# Patient Record
Sex: MALE | Race: WHITE | Employment: OTHER | URBAN - METROPOLITAN AREA
[De-identification: names, ages, dates, MRNs, and addresses within clinical notes are randomized per-mention and may not be internally consistent; named-entity substitution may affect disease eponyms.]

---

## 2024-02-26 ENCOUNTER — HOSPITAL ENCOUNTER (OUTPATIENT)
Age: 64
Discharge: HOME | End: 2024-02-26
Payer: MEDICARE

## 2024-02-26 VITALS
DIASTOLIC BLOOD PRESSURE: 71 MMHG | TEMPERATURE: 98 F | OXYGEN SATURATION: 100 % | SYSTOLIC BLOOD PRESSURE: 133 MMHG | HEART RATE: 68 BPM | RESPIRATION RATE: 18 BRPM

## 2024-02-26 DIAGNOSIS — J06.9 UPPER RESPIRATORY VIRUS: Primary | ICD-10-CM

## 2024-02-26 LAB
S PYO AG THROAT QL: NEGATIVE
SARS-COV-2 RNA RESP QL NAA+PROBE: NOT DETECTED

## 2024-02-26 PROCEDURE — 87880 STREP A ASSAY W/OPTIC: CPT | Performed by: NURSE PRACTITIONER

## 2024-02-26 PROCEDURE — U0002 COVID-19 LAB TEST NON-CDC: HCPCS | Performed by: NURSE PRACTITIONER

## 2024-02-26 PROCEDURE — 99203 OFFICE O/P NEW LOW 30 MIN: CPT | Performed by: NURSE PRACTITIONER

## 2024-02-26 NOTE — ED INITIAL ASSESSMENT (HPI)
Pt c/o sore throat x5 days, cough, head pressure and nasal congestion x4 days, L ear pain, echo noise and feeling clogged today.  No fever.

## 2024-02-26 NOTE — DISCHARGE INSTRUCTIONS
Push fluids.  Try decongestant like Sudafed during the day and Benadryl or NyQuil at night.  You can also try Flonase.  Tylenol or ibuprofen as needed for pain or fever.  Follow-up with your doctor if your symptoms persist.  Return for any concerns.

## 2024-02-26 NOTE — ED PROVIDER NOTES
Patient Seen in: [facility]      History     Chief Complaint   Patient presents with    Sore Throat     Stated Complaint: sore throat  Subjective:   63-year-old male presents for sinus and nasal congestion, generalized headache, postnasal drip, and a mild dry cough that started about 5 days ago.  No sick contacts at home.  No fevers.  He does feel fullness in his ears.  He does have a sore throat.  No difficulty swallowing.  Speech is clear.  No chest pain or difficulty breathing.  He is eating and drinking without vomiting or diarrhea, but has a decreased appetite.  No neck stiffness.  No rashes.  No dizziness.  He appears nontoxic.      Objective:   History reviewed. No pertinent past medical history.         History reviewed. No pertinent surgical history.           Social History     Socioeconomic History    Marital status: Single   Tobacco Use    Smoking status: Never    Smokeless tobacco: Never   Substance and Sexual Activity    Alcohol use: Yes     Comment: occ    Drug use: Never            Review of Systems   HENT:  Positive for congestion, postnasal drip, rhinorrhea, sinus pressure and sore throat.    Respiratory:  Positive for cough.    All other systems reviewed and are negative.      Positive for stated complaint: sore throat  Other systems are as noted in HPI.  Constitutional and vital signs reviewed.      All other systems reviewed and negative except as noted above.    Physical Exam     ED Triage Vitals   BP 02/26/24 0932 133/71   Pulse 02/26/24 0930 68   Resp 02/26/24 0930 18   Temp 02/26/24 0930 98.3 °F (36.8 °C)   Temp src 02/26/24 0930 Temporal   SpO2 02/26/24 0930 100 %   O2 Device 02/26/24 0930 None (Room air)     Current:/71   Pulse 68   Temp 98.3 °F (36.8 °C) (Temporal)   Resp 18   SpO2 100%     Physical Exam  Vitals and nursing note reviewed.   Constitutional:       General: He is not in acute distress.     Appearance: He is well-developed. He is not  toxic-appearing.   HENT:      Head: Normocephalic.      Right Ear: Tympanic membrane normal.      Left Ear: A middle ear effusion is present.      Nose: Rhinorrhea present. No congestion.      Mouth/Throat:      Mouth: Mucous membranes are moist. No oral lesions.      Pharynx: Oropharynx is clear. Posterior oropharyngeal erythema present. No pharyngeal swelling, oropharyngeal exudate or uvula swelling.      Tonsils: No tonsillar exudate or tonsillar abscesses.   Eyes:      Conjunctiva/sclera: Conjunctivae normal.   Cardiovascular:      Rate and Rhythm: Normal rate and regular rhythm.   Pulmonary:      Effort: Pulmonary effort is normal.      Breath sounds: Normal breath sounds.   Musculoskeletal:      Cervical back: Normal range of motion and neck supple.   Lymphadenopathy:      Cervical: No cervical adenopathy.   Skin:     General: Skin is warm and dry.      Capillary Refill: Capillary refill takes less than 2 seconds.      Findings: No rash.   Neurological:      General: No focal deficit present.      Mental Status: He is alert and oriented to person, place, and time.   Psychiatric:         Mood and Affect: Mood normal.         Behavior: Behavior normal.         ED Course     Labs Reviewed   POCT RAPID STREP - Normal   RAPID SARS-COV-2 BY PCR - Normal         MDM       Medical Decision Making  The COVID and strep test are negative.  The patient symptoms are most likely viral.  We discussed trying Sudafed during the day and NyQuil or Benadryl at night.  We also discussed Flonase.  He will take ibuprofen or Tylenol for any pain or fever, push fluids, and rest.  He is aware he should follow-up with his primary care doctor if his symptoms persist.    Amount and/or Complexity of Data Reviewed  Labs: ordered.     Details: COVID and strep test are negative.    Risk  OTC drugs.  Risk Details: COVID versus strep throat versus an upper respiratory virus.        Disposition and Plan     Clinical Impression:  1. Upper  respiratory virus         Disposition:  Discharge  2/26/2024 10:11 am    Follow-up:  PMD    Schedule an appointment as soon as possible for a visit in 3 days            Medications Prescribed:  There are no discharge medications for this patient.

## 2024-03-01 ENCOUNTER — HOSPITAL ENCOUNTER (OUTPATIENT)
Age: 64
Discharge: HOME | End: 2024-03-01
Payer: MEDICARE

## 2024-03-01 VITALS
HEART RATE: 54 BPM | TEMPERATURE: 98 F | DIASTOLIC BLOOD PRESSURE: 81 MMHG | RESPIRATION RATE: 14 BRPM | OXYGEN SATURATION: 100 % | SYSTOLIC BLOOD PRESSURE: 129 MMHG

## 2024-03-01 DIAGNOSIS — J01.90 ACUTE NON-RECURRENT SINUSITIS, UNSPECIFIED LOCATION: Primary | ICD-10-CM

## 2024-03-01 PROCEDURE — 99213 OFFICE O/P EST LOW 20 MIN: CPT

## 2024-03-01 RX ORDER — AMOXICILLIN AND CLAVULANATE POTASSIUM 875; 125 MG/1; MG/1
1 TABLET, FILM COATED ORAL 2 TIMES DAILY
Qty: 20 TABLET | Refills: 0 | Status: SHIPPED | OUTPATIENT
Start: 2024-03-01 | End: 2024-03-11

## 2024-03-01 NOTE — ED PROVIDER NOTES
Patient Seen in: [facility]      History     Chief Complaint   Patient presents with    Sinusitis     Stated Complaint: Sinus Problem    Subjective:   Ramos is a 63-year-old male presenting to the immediate care complaining of cough, congestion, rhinorrhea, sinus pressure, postnasal drip and a mild headache for the past 10 days.  Patient was seen here on 2/26 for same complaints and had a negative COVID and strep test and was diagnosed with viral illness.  States has been trying over-the-counter medications with no relief.  States that he feels like his symptoms are getting mildly worse in the last 24 to 48 hours.  He describes his headache as sinus pressure.  Not the worst headache of his life or sudden or thunderclap onset.  No episodes of respiratory distress or difficulty breathing with cough.  Patient denies any fever, chest pain, shortness of breath, abdominal pain or vomiting.  He has been eating and drinking well and is well-hydrated.  He denies any other concerns or complaints.          Objective:   History reviewed. No pertinent past medical history.           History reviewed. No pertinent surgical history.             Social History     Socioeconomic History    Marital status: Single   Tobacco Use    Smoking status: Never    Smokeless tobacco: Never   Substance and Sexual Activity    Alcohol use: Yes     Comment: occ    Drug use: Never              Review of Systems   HENT:  Positive for congestion, postnasal drip, rhinorrhea, sinus pressure and sinus pain.    All other systems reviewed and are negative.      Positive for stated complaint: Sinus Problem  Other systems are as noted in HPI.  Constitutional and vital signs reviewed.      All other systems reviewed and negative except as noted above.    Physical Exam     ED Triage Vitals [03/01/24 1033]   /81   Pulse 54   Resp 14   Temp 97.9 °F (36.6 °C)   Temp src Temporal   SpO2 100 %   O2 Device        Current:/81   Pulse 54    Temp 97.9 °F (36.6 °C) (Temporal)   Resp 14   SpO2 100%         Physical Exam  Vitals and nursing note reviewed.   Constitutional:       General: He is not in acute distress.     Appearance: Normal appearance. He is not ill-appearing, toxic-appearing or diaphoretic.   HENT:      Head: Normocephalic.      Right Ear: Ear canal and external ear normal. A middle ear effusion is present.      Left Ear: Ear canal and external ear normal. A middle ear effusion is present.      Nose: Congestion and rhinorrhea present.      Mouth/Throat:      Mouth: Mucous membranes are moist.      Pharynx: Oropharynx is clear. Uvula midline. Posterior oropharyngeal erythema present. No pharyngeal swelling or oropharyngeal exudate.      Tonsils: No tonsillar exudate or tonsillar abscesses. 0 on the right. 0 on the left.      Comments: Mild cobblestoning, no trismus  Eyes:      Conjunctiva/sclera: Conjunctivae normal.   Cardiovascular:      Rate and Rhythm: Normal rate and regular rhythm.      Pulses: Normal pulses.      Heart sounds: Normal heart sounds.   Pulmonary:      Effort: Pulmonary effort is normal. No respiratory distress.      Breath sounds: Normal breath sounds. No stridor. No wheezing, rhonchi or rales.   Abdominal:      General: Abdomen is flat.   Musculoskeletal:         General: Normal range of motion.      Cervical back: Normal range of motion.   Skin:     General: Skin is warm.      Capillary Refill: Capillary refill takes less than 2 seconds.   Neurological:      General: No focal deficit present.      Mental Status: He is alert and oriented to person, place, and time.   Psychiatric:         Mood and Affect: Mood normal.         Behavior: Behavior normal.         Thought Content: Thought content normal.         Judgment: Judgment normal.       ED Course   Labs Reviewed - No data to display       MDM           Medical Decision Making  Multiple medical diagnoses were considered including but not limited to viral versus  bacterial etiology of upper respiratory complaints.  Patient is well appearing, non-toxic and in no acute distress.  Vital signs are stable.   History and physical exam are consistent with sinusitis.  Given the duration of illness will treat for bacterial sinusitis.  Sent a prescription for Augmentin to treat sinusitis.  Recommended that patient drink plenty of fluids, use Tylenol and Motrin for pain or fever, use Flonase and Mucinex for nasal congestion and may use over-the-counter medications for congestion as needed.  Also recommended using saline rinses/[place] pot for nasal congestion.  Recommended that if the patient develops any chest pain, respiratory complaints, severe headache, fever that does not improve with medications or any other concerning complaints they should go to the emergency department.    ED precautions discussed.  Patient advised to follow up with PCP in 2-3 days.  Patient agrees with this plan of care.  Patient verbalizes understanding of discharge instructions and plan of care.      Risk  OTC drugs.  Prescription drug management.        Disposition and Plan     Clinical Impression:  1. Acute non-recurrent sinusitis, unspecified location         Disposition:  Discharge  3/1/2024 11:04 am    Follow-up:  Delfin Mcclure MD  [address] [place] [id number]  [phone]                Medications Prescribed:  Discharge Medication List as of 3/1/2024 11:04 AM        START taking these medications    Details   amoxicillin clavulanate 875-125 MG Oral Tab Take 1 tablet by mouth 2 (two) times daily for 10 days., Print, Disp-20 tablet, R-0

## 2024-03-01 NOTE — ED INITIAL ASSESSMENT (HPI)
Sinus pain for over 1 week. Denies fevers or chills. +nasal drainage and congestion. +cough. Patient reports 2 covid tests negative in past 3 days.

## 2024-03-01 NOTE — DISCHARGE INSTRUCTIONS
Please take the antibiotics as prescribed for sinusitis.  Please also use Flonase and Mucinex for nasal congestion. May also use over the counter decongestants.  You can also try using a [place] pot/saline rinses for congestion.  Use Tylenol or Motrin for pain or fever.  If you develop any shortness of breath, chest pain, severe headache, fever that does not resolve with medications or any other worsening or concerning symptoms you should go to the emergency department.  Otherwise follow-up with your primary care doctor.

## 2024-03-18 ENCOUNTER — OFFICE VISIT (OUTPATIENT)
Dept: INTERNAL MEDICINE CLINIC | Facility: CLINIC | Age: 64
End: 2024-03-18

## 2024-03-18 VITALS
OXYGEN SATURATION: 99 % | DIASTOLIC BLOOD PRESSURE: 88 MMHG | SYSTOLIC BLOOD PRESSURE: 138 MMHG | HEART RATE: 53 BPM | BODY MASS INDEX: 23.1 KG/M2 | WEIGHT: 165 LBS | HEIGHT: 71 IN | RESPIRATION RATE: 16 BRPM | TEMPERATURE: 98 F

## 2024-03-18 DIAGNOSIS — N50.811 PAIN IN RIGHT TESTICLE: Primary | ICD-10-CM

## 2024-03-18 LAB
APPEARANCE: CLEAR
BILIRUBIN: NEGATIVE
GLUCOSE (URINE DIPSTICK): NEGATIVE MG/DL
KETONES (URINE DIPSTICK): NEGATIVE MG/DL
LEUKOCYTES: NEGATIVE
MULTISTIX LOT#: NORMAL NUMERIC
NITRITE, URINE: NEGATIVE
OCCULT BLOOD: NEGATIVE
PH, URINE: 7 (ref 4.5–8)
PROTEIN (URINE DIPSTICK): NEGATIVE MG/DL
SPECIFIC GRAVITY: 1.02 (ref 1–1.03)
URINE-COLOR: YELLOW
UROBILINOGEN,SEMI-QN: 0.2 MG/DL (ref 0–1.9)

## 2024-03-18 PROCEDURE — 81003 URINALYSIS AUTO W/O SCOPE: CPT | Performed by: INTERNAL MEDICINE

## 2024-03-18 PROCEDURE — 99204 OFFICE O/P NEW MOD 45 MIN: CPT | Performed by: INTERNAL MEDICINE

## 2024-03-18 RX ORDER — DOXYCYCLINE HYCLATE 100 MG/1
100 CAPSULE ORAL 2 TIMES DAILY
Qty: 14 CAPSULE | Refills: 0 | Status: SHIPPED | OUTPATIENT
Start: 2024-03-18 | End: 2024-03-25

## 2024-03-18 NOTE — PROGRESS NOTES
Subjective:   Patient ID: Ramos Garner is a 63 year old male.    HPI    Pt comes in today for the first time [facility] care. 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 No flank pain, no fever.     History/Other:   Review of Systems   Constitutional: Negative.  Negative for fatigue and fever.   HENT: Negative.  Negative for congestion.    Eyes: Negative.    Respiratory: Negative.  Negative for cough, shortness of breath and wheezing.    Cardiovascular: Negative.  Negative for chest pain, palpitations and leg swelling.   Gastrointestinal: Negative.    Endocrine: Negative for cold intolerance and heat intolerance.   Genitourinary:  Positive for testicular pain. Negative for dysuria, flank pain and hematuria.   Musculoskeletal: Negative.  Negative for arthralgias, back pain and myalgias.   Skin: Negative.    Neurological: Negative.  Negative for dizziness, tremors, syncope, weakness and headaches.   Psychiatric/Behavioral: Negative.  Negative for agitation, behavioral problems and suicidal ideas. The patient is not nervous/anxious.      Current Outpatient Medications   Medication Sig Dispense Refill    doxycycline 100 MG Oral Cap Take 1 capsule (100 mg total) by mouth 2 (two) times daily for 7 days. 14 capsule 0     Allergies:No Known Allergies    Objective:   Physical Exam  Vitals and nursing note reviewed.   Constitutional:       Appearance: He is well-developed.   HENT:      Head: Normocephalic and atraumatic.      Right Ear: External ear normal.      Left Ear: External ear normal.      Nose: Nose normal.   Eyes:      Conjunctiva/sclera: Conjunctivae normal.      Pupils: Pupils are equal, round, and reactive to light.   Cardiovascular:      Rate and Rhythm: Normal rate and regular rhythm.      Heart sounds: Normal heart sounds.    Pulmonary:      Effort: Pulmonary effort is normal.      Breath sounds: Normal breath sounds.   Abdominal:      General: Bowel sounds are normal.      Palpations: Abdomen is soft.   Genitourinary:     Penis: Normal.       Prostate: Normal.      Rectum: Normal.      Comments: Rt testicular pain and swelling   Musculoskeletal:         General: Normal range of motion.      Cervical back: Normal range of motion and neck supple.   Skin:     General: Skin is warm and dry.   Neurological:      Mental Status: He is alert and oriented to person, place, and time.      Deep Tendon Reflexes: Reflexes are normal and symmetric.         Assessment & Plan:   1. Pain in right testicle - will order US, also will send UA for chlamydia and gonorrhea, and will treat with doxy 100 mg bid x 7 days , will referr to urology   Pt told that he needs to rest and most traciley would n ot be able to make the [place] trip        Orders Placed This Encounter   Procedures    POC Urinalysis, Automated Dip without microscopy (PCA and EMMG ONLY) [22284]    Chlamydia/Gc Amplification [E]       Meds This Visit:  Requested Prescriptions     Signed Prescriptions Disp Refills    doxycycline 100 MG Oral Cap 14 capsule 0     Sig: Take 1 capsule (100 mg total) by mouth 2 (two) times daily for 7 days.       Imaging & Referrals:  UROLOGY - INTERNAL  US SCROTUM W/ DOPPLER (CPT=93975/61064)

## 2024-03-19 ENCOUNTER — HOSPITAL ENCOUNTER (OUTPATIENT)
Dept: ULTRASOUND IMAGING | Facility: HOSPITAL | Age: 64
Discharge: HOME | End: 2024-03-19
Attending: INTERNAL MEDICINE
Payer: MEDICARE

## 2024-03-19 DIAGNOSIS — N50.811 PAIN IN RIGHT TESTICLE: ICD-10-CM

## 2024-03-19 LAB
C TRACH DNA SPEC QL NAA+PROBE: NEGATIVE
N GONORRHOEA DNA SPEC QL NAA+PROBE: NEGATIVE

## 2024-03-19 PROCEDURE — 93975 VASCULAR STUDY: CPT | Performed by: INTERNAL MEDICINE

## 2024-03-19 PROCEDURE — 76870 US EXAM SCROTUM: CPT | Performed by: INTERNAL MEDICINE

## 2024-03-20 ENCOUNTER — OFFICE VISIT (OUTPATIENT)
Dept: SURGERY | Facility: CLINIC | Age: 64
End: 2024-03-20
Payer: MEDICARE

## 2024-03-20 DIAGNOSIS — N50.811 PAIN IN RIGHT TESTICLE: Primary | ICD-10-CM

## 2024-03-20 PROCEDURE — 99204 OFFICE O/P NEW MOD 45 MIN: CPT | Performed by: UROLOGY

## 2024-03-20 NOTE — PROGRESS NOTES
Eveline Landon MD  Department of Urology  [address] [place] [address] Rd[address]  [place] [address]    T: [phone]  F: [phone]    To: None Pcp   [facility] [address]    Re: Ramos Garner   MRN: [id number]  : 1960    Dear None Pcp,    Today I had the pleasure of seeing Ramos Garner in my clinic. As you know, Mr. Garner is a pleasant 63 year old year old male who I am seeing for testicular pain. Patient was last seen in this department on Visit date not found.    Briefly, patient saw his primary care provider on 3/18/2024.  He complained of right-sided testicular pain.  He denies any urinary symptoms.  Testing was completed with a urine analysis which was negative, gonorrhea and chlamydia which was negative and COVID which is negative.  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    This has improved with time.  He is an avid cyclist and is actually going on a cycling trip to [facility] today.        PAST MEDICAL HISTORY:  No past medical history on file.     PAST SURGICAL HISTORY:  Past Surgical History:   Procedure Laterality Date    COLONOSCOPY  unsure - 5+ yrs ago    OTHER SURGICAL HISTORY  clavicle    VASECTOMY           ALLERGIES:  No Known Allergies      MEDICATIONS:  Current Outpatient Medications   Medication Instructions    doxycycline (VIBRAMYCIN) 100 mg, Oral, 2 times daily        FAMILY HISTORY:  No family history on file.     SOCIAL HISTORY:  Social History     Socioeconomic History    Marital status: Single   Tobacco Use    Smoking status: Never    Smokeless tobacco: Never   Substance and Sexual Activity    Alcohol use: Yes     Alcohol/week: 8.0 standard drinks of alcohol     Types: 4 Glasses of wine, 2 Cans of beer, 2 Standard drinks or equivalent per week     Comment: occ    Drug use: Never          PHYSICAL EXAMINATION:  There were no vitals filed for this  visit.  CONSTITUTIONAL: No apparent distress, cooperative and communicative  NEUROLOGIC: Alert and oriented   HEAD: Normocephalic, atraumatic   EYES: Sclera non-icteric   ENT: Hearing intact, moist mucous membranes   NECK: No obvious goiter or masses   RESPIRATORY: Normal respiratory effort, Nonlabored breathing on room air  SKIN: No evident rashes   ABDOMEN: Soft, nontender, nondistended, no rebound tenderness, no guarding, no masses  GENITOURINARY: Bilateral testicles descended in scrotum without any masses or pain to palpation; bilateral vasa palpated; cord lipoma palpated on the right side    REVIEW OF SYSTEMS:    A comprehensive 10-point review of systems was completed.  Pertinent positives and negatives are noted in the the HPI.       LABORATORY DATA:      Component  Ref Range & Units 3/18/24  5:06 PM   Glucose Urine  Negative mg/dL Negative   Bilirubin Urine  Negative Negative   Ketones, UA  Negative - Trace mg/dL Negative   Spec Gravity  1.005 - 1.030 1.020   Blood Urine  Negative Negative   PH Urine  5.0 - 8.0 7.0   Protein Urine  Negative - Trace mg/dL Negative   Urobilinogen Urine  0.2 - 1.0 mg/dL 0.2   Nitrite Urine  Negative Negative   Leukocyte Esterase Urine  Negative Negative   APPEARANCE  Clear clear   Color Urine  Yellow yellow   Multistix Lot#  Numeric [id number]   Multistix Expiration Date  Date 08/31/24        Chlamydia Trachomatis Amplified RNA  Negative Negative   Neisseria Gonorrhoeae Amplified RNA  Negative Negative   Chlam/GC Source Urine           IMAGING REVIEW:  Narrative   PROCEDURE: US SCROTUM W/DOPPLER (CPT=93975/75087)     COMPARISON: None.     INDICATIONS: Pain in right testicle.  Vasectomy 25 years ago.     TECHNIQUE: The scrotum was evaluated with gray scale and color duplex Doppler sonography.     FINDINGS:     RIGHT  TESTICLE: 4.6 x 2.4 x 3.4 cm.  Normal echogenicity.  No masses.    EPIDIDYMIS: Normal size.  An epididymal head cyst measures 3 x 2 x 3 mm.  Circumscribed echogenic  focus in the epididymal body measures 6 x 7 x 7 mm.  OTHER: No varicocele.  Small hydrocele.  DOPPLER: Normal arterial and venous flow in the testicle with color and pulsed Doppler.  Normal epididymal flow.       LEFT  TESTICLE: 4.0 x 2.7 x 3.2 cm.  Normal echogenicity.  No masses.    EPIDIDYMIS: Normal size and echogenicity.    OTHER: No varicocele.  Small hydrocele.  Appendix testis is noted.    DOPPLER: Normal arterial and venous flow in the testicle with color pulsed Doppler.  Normal epididymal flow.                 Impression   CONCLUSION:     No intratesticular masses.  No evidence of testicular torsion.     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     Small bilateral hydroceles.     Left-sided appendix testis is noted.           elm-remote           Dictated by (CST): Urbano Null MD on 3/19/2024 at 1:23 PM      Finalized by (CST): Urbano Null MD on 3/19/2024 at 1:31 PM         OTHER RELEVANT DATA:   none     IMPRESSION: Right testicular pain without any abnormalities on exam, laboratory parameters or ultrasound-recommended behavioral management.  Offered tamsulosin 0.4 mg nightly    Behavioral management includes timed voiding, double voiding, avoiding bladder irritants (coffee, tea, soda, alcohol), sitz baths, meditation 10 minutes every day, constipation avoidance, voiding prior to bedtime, avoiding fluids 2 to 4 hours before bedtime, compression stockings.     Tamsulosin will help relax the pelvic floor muscles.  It should be taken at night as it can cause some dizziness.  It also can cause decreased ejaculate volume and should be stopped if he is trying to conceive.     He should give this 2 to 3 months to have an effect.  If additional benefit is needed can consider pelvic floor physical therapy.  Can also consider diagnostic testing with cystoscopy and transrectal  ultrasound.  Rectal suppositories with Valium and baclofen may be helpful in the future.       PLAN:  Behavioral management  Return to clinic as needed  Could consider tamsulosin if no improvement in 6 to 8 weeks    Thank you for referring this very pleasant patient to my clinic. If you have any questions or concerns, please do not hesitate to contact me.    Sincerely,  Eveline Landon MD    30 minutes were spent on this patient at this visit obtaining a history, reviewing medical records, developing a treatment plan, counseling and discussing treatment strategy with patient, coordination of care and documentation.     The 21st Century Cures Act makes medical notes available to patients in the interest of transparency.  However, please be advised that this is a medical document.  It is intended as a peer to peer communication.  It is written in medical language and may contain abbreviations or verbiage that are technical and unfamiliar.  It may appear blunt or direct.  Medical documents are intended to carry relevant information, facts as evident, and the clinical opinion of the practitioner.

## 2025-02-07 ENCOUNTER — OFFICE VISIT (OUTPATIENT)
Dept: INTERNAL MEDICINE CLINIC | Facility: CLINIC | Age: 65
End: 2025-02-07
Payer: MEDICARE

## 2025-02-07 VITALS
TEMPERATURE: 98 F | HEIGHT: 71 IN | SYSTOLIC BLOOD PRESSURE: 128 MMHG | BODY MASS INDEX: 23.38 KG/M2 | RESPIRATION RATE: 17 BRPM | OXYGEN SATURATION: 99 % | HEART RATE: 54 BPM | WEIGHT: 167 LBS | DIASTOLIC BLOOD PRESSURE: 72 MMHG

## 2025-02-07 DIAGNOSIS — Z13.220 SCREENING FOR CHOLESTEROL LEVEL: ICD-10-CM

## 2025-02-07 DIAGNOSIS — Z00.00 MEDICARE ANNUAL WELLNESS VISIT, SUBSEQUENT: Primary | ICD-10-CM

## 2025-02-07 DIAGNOSIS — Z13.29 SCREENING FOR THYROID DISORDER: ICD-10-CM

## 2025-02-07 DIAGNOSIS — Z00.00 ENCOUNTER FOR PREVENTIVE CARE: ICD-10-CM

## 2025-02-07 DIAGNOSIS — Z12.5 SCREENING PSA (PROSTATE SPECIFIC ANTIGEN): ICD-10-CM

## 2025-02-07 DIAGNOSIS — R17 ELEVATED BILIRUBIN: ICD-10-CM

## 2025-02-07 LAB
ALBUMIN SERPL-MCNC: 5 G/DL (ref 3.2–4.8)
ALBUMIN/GLOB SERPL: 1.9 {RATIO} (ref 1–2)
ALP LIVER SERPL-CCNC: 89 U/L
ALT SERPL-CCNC: 16 U/L
ANION GAP SERPL CALC-SCNC: 8 MMOL/L (ref 0–18)
AST SERPL-CCNC: 24 U/L (ref ?–34)
BASOPHILS # BLD AUTO: 0.08 X10(3) UL (ref 0–0.2)
BASOPHILS NFR BLD AUTO: 1.5 %
BILIRUB SERPL-MCNC: 1.7 MG/DL (ref 0.2–1.1)
BILIRUB UR QL: NEGATIVE
BUN BLD-MCNC: 20 MG/DL (ref 9–23)
BUN/CREAT SERPL: 18.2 (ref 10–20)
CALCIUM BLD-MCNC: 9.7 MG/DL (ref 8.7–10.4)
CHLORIDE SERPL-SCNC: 105 MMOL/L (ref 98–112)
CHOLEST SERPL-MCNC: 236 MG/DL (ref ?–200)
CLARITY UR: CLEAR
CO2 SERPL-SCNC: 29 MMOL/L (ref 21–32)
COMPLEXED PSA SERPL-MCNC: 2.32 NG/ML (ref ?–4)
CREAT BLD-MCNC: 1.1 MG/DL
DEPRECATED RDW RBC AUTO: 45.1 FL (ref 35.1–46.3)
EGFRCR SERPLBLD CKD-EPI 2021: 74 ML/MIN/1.73M2 (ref 60–?)
EOSINOPHIL # BLD AUTO: 0.18 X10(3) UL (ref 0–0.7)
EOSINOPHIL NFR BLD AUTO: 3.4 %
ERYTHROCYTE [DISTWIDTH] IN BLOOD BY AUTOMATED COUNT: 12.9 % (ref 11–15)
FASTING PATIENT LIPID ANSWER: YES
FASTING STATUS PATIENT QL REPORTED: YES
GLOBULIN PLAS-MCNC: 2.6 G/DL (ref 2–3.5)
GLUCOSE BLD-MCNC: 72 MG/DL (ref 70–99)
GLUCOSE UR-MCNC: NORMAL MG/DL
HCT VFR BLD AUTO: 45.7 %
HDLC SERPL-MCNC: 75 MG/DL (ref 40–59)
HGB BLD-MCNC: 15.6 G/DL
HGB UR QL STRIP.AUTO: NEGATIVE
IMM GRANULOCYTES # BLD AUTO: 0.01 X10(3) UL (ref 0–1)
IMM GRANULOCYTES NFR BLD: 0.2 %
KETONES UR-MCNC: NEGATIVE MG/DL
LDLC SERPL CALC-MCNC: 143 MG/DL (ref ?–100)
LEUKOCYTE ESTERASE UR QL STRIP.AUTO: NEGATIVE
LYMPHOCYTES # BLD AUTO: 1.47 X10(3) UL (ref 1–4)
LYMPHOCYTES NFR BLD AUTO: 28 %
MCH RBC QN AUTO: 32.3 PG (ref 26–34)
MCHC RBC AUTO-ENTMCNC: 34.1 G/DL (ref 31–37)
MCV RBC AUTO: 94.6 FL
MONOCYTES # BLD AUTO: 0.59 X10(3) UL (ref 0.1–1)
MONOCYTES NFR BLD AUTO: 11.2 %
NEUTROPHILS # BLD AUTO: 2.92 X10 (3) UL (ref 1.5–7.7)
NEUTROPHILS # BLD AUTO: 2.92 X10(3) UL (ref 1.5–7.7)
NEUTROPHILS NFR BLD AUTO: 55.7 %
NITRITE UR QL STRIP.AUTO: NEGATIVE
NONHDLC SERPL-MCNC: 161 MG/DL (ref ?–130)
OSMOLALITY SERPL CALC.SUM OF ELEC: 295 MOSM/KG (ref 275–295)
PH UR: 6.5 [PH] (ref 5–8)
PLATELET # BLD AUTO: 280 10(3)UL (ref 150–450)
POTASSIUM SERPL-SCNC: 4.1 MMOL/L (ref 3.5–5.1)
PROT SERPL-MCNC: 7.6 G/DL (ref 5.7–8.2)
PROT UR-MCNC: NEGATIVE MG/DL
RBC # BLD AUTO: 4.83 X10(6)UL
SODIUM SERPL-SCNC: 142 MMOL/L (ref 136–145)
SP GR UR STRIP: 1.02 (ref 1–1.03)
TRIGL SERPL-MCNC: 103 MG/DL (ref 30–149)
TSI SER-ACNC: 1.83 UIU/ML (ref 0.55–4.78)
UROBILINOGEN UR STRIP-ACNC: NORMAL
VLDLC SERPL CALC-MCNC: 19 MG/DL (ref 0–30)
WBC # BLD AUTO: 5.3 X10(3) UL (ref 4–11)

## 2025-02-07 PROCEDURE — 84443 ASSAY THYROID STIM HORMONE: CPT | Performed by: INTERNAL MEDICINE

## 2025-02-07 PROCEDURE — 80061 LIPID PANEL: CPT | Performed by: INTERNAL MEDICINE

## 2025-02-07 PROCEDURE — 85025 COMPLETE CBC W/AUTO DIFF WBC: CPT | Performed by: INTERNAL MEDICINE

## 2025-02-07 PROCEDURE — 80053 COMPREHEN METABOLIC PANEL: CPT | Performed by: INTERNAL MEDICINE

## 2025-02-07 PROCEDURE — 82248 BILIRUBIN DIRECT: CPT | Performed by: INTERNAL MEDICINE

## 2025-02-07 PROCEDURE — 81003 URINALYSIS AUTO W/O SCOPE: CPT | Performed by: INTERNAL MEDICINE

## 2025-02-07 NOTE — PROGRESS NOTES
Subjective:   Ramos Garner is a 64 year old male who presents for a Medicare Subsequent Annual Wellness visit (Pt already had Initial Annual Wellness) and .     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 Fall Prevention information provided to patient in After Visit Summary.    Do you feel unsteady when standing or walking?: (Patient-Rptd) No  Do you worry about falling?: (Patient-Rptd) No  Have you fallen in the past year?: (Patient-Rptd) Yes  How many times have you fallen?: (Patient-Rptd) (P) 1  Were you injured?: (Patient-Rptd) (P) No     Cognitive Assessment:   He had a completely normal cognitive assessment - see flowsheet entries     Functional Ability/Status:   Ramos Garner has a completely normal functional assessment. See flowsheet for details.      Depression Screening (PHQ):  PHQ-2 SCORE: 0  , done 2/7/2025   If you checked off any problems, how difficult have these problems made it for you to do your work, take care of things at home, or get along with other people?: Not difficult at all    Last [place] Suicide Screening on 2/7/2025 was No Risk.          Advanced Directives:   He does have a Living Will but we do NOT have it on file in Epic.    He does have a POA but we do NOT have it on file in Epic.    Discussed Advance Care Planning with patient (and family/surrogate if present). Standard forms made available to patient in After Visit Summary.      There is no problem list on file for this patient.    Allergies:  He has No Known Allergies.    Current Medications:  No outpatient medications have been marked as taking for the 2/7/25 encounter (Office Visit) with Delfin Mcclure MD.       Medical  History:  He  has no past medical history on file.  Surgical History:  He  has a past surgical history that includes colonoscopy (unsure - 5+ yrs ago); vasectomy; and other surgical history (clavicle).   Family History:  His family history is not on file.  Social History:  He  reports that he has never smoked. He has never been exposed to tobacco smoke. He has never used smokeless tobacco. He reports current alcohol use of about 8.0 standard drinks of alcohol per week. He reports that he does not use drugs.    Tobacco:  He has never smoked tobacco.    CAGE Alcohol Screen:   He has been screened for alcohol abuse and his score is not 0:  Cut: Have you ever felt you should Cut down on your drinking?: (Patient-Rptd) Yes  Annoyed: Have people Annoyed you by criticizing your drinking?: (Patient-Rptd) No  Guilty: Have you ever felt bad or Guilty about your drinking?: (Patient-Rptd) No  Eye Opener: Have you ever had a drink first thing in the morning to steady your nerves or to get rid of a hangover (Eye opener)?: (Patient-Rptd) No  Total Score: (Patient-Rptd) 1      Patient Care Team:  Delfin Mcclure MD as PCP - General (Internal Medicine)    Review of Systems   Constitutional: Negative.  Negative for fatigue and fever.   HENT: Negative.  Negative for congestion.    Eyes: Negative.    Respiratory: Negative.  Negative for cough, shortness of breath and wheezing.    Cardiovascular: Negative.  Negative for chest pain, palpitations and leg swelling.   Gastrointestinal: Negative.    Endocrine: Negative for cold intolerance and heat intolerance.   Genitourinary: Negative.  Negative for dysuria, flank pain and hematuria.   Musculoskeletal: Negative.  Negative for arthralgias, back pain and myalgias.   Skin: Negative.    Neurological: Negative.  Negative for dizziness, tremors, syncope, weakness and headaches.   Psychiatric/Behavioral: Negative.  Negative for agitation, behavioral problems and suicidal ideas. The patient is not  nervous/anxious.           Objective:   Physical Exam  Vitals and nursing note reviewed.   Constitutional:       Appearance: He is well-developed.   HENT:      Head: Normocephalic and atraumatic.      Right Ear: External ear normal.      Left Ear: External ear normal.      Nose: Nose normal.   Eyes:      Conjunctiva/sclera: Conjunctivae normal.      Pupils: Pupils are equal, round, and reactive to light.   Cardiovascular:      Rate and Rhythm: Normal rate and regular rhythm.      Heart sounds: Normal heart sounds.   Pulmonary:      Effort: Pulmonary effort is normal.      Breath sounds: Normal breath sounds.   Abdominal:      General: Bowel sounds are normal.      Palpations: Abdomen is soft.   Genitourinary:     Penis: Normal.       Prostate: Normal.      Rectum: Normal.   Musculoskeletal:         General: Normal range of motion.      Cervical back: Normal range of motion and neck supple.   Skin:     General: Skin is warm and dry.   Neurological:      Mental Status: He is alert and oriented to person, place, and time.      Deep Tendon Reflexes: Reflexes are normal and symmetric.          /72 (BP Location: Left arm, Patient Position: Sitting, Cuff Size: adult)   Pulse 54   Temp 98 °F (36.7 °C) (Oral)   Resp 17   Ht 5' 11\" (1.803 m)   Wt 167 lb (75.8 kg)   SpO2 99%   BMI 23.29 kg/m²  Estimated body mass index is 23.29 kg/m² as calculated from the following:    Height as of this encounter: 5' 11\" (1.803 m).    Weight as of this encounter: 167 lb (75.8 kg).    Medicare Hearing Assessment:   Hearing Screening    Screening Method: Questionnaire  I have a problem hearing over the telephone: No I have trouble following the conversations when two or more people are talking at the same time: No   I have trouble understanding things on the TV: No I have to strain to understand conversations: No   I have to worry about missing the telephone ring or doorbell: No I have trouble hearing conversations in a noisy  background such as a crowded room or restaurant: No   I get confused about where sounds come from: No I misunderstand some words in a sentence and need to ask people to repeat themselves: No   I especially have trouble understanding the speech of women and children: No I have trouble understanding the speaker in a large room such as at a meeting or place of Yarsanism: No   Many people I talk to seem to mumble (or don't speak clearly): No People get annoyed because I misunderstand what they say: No   I misunderstand what others are saying and make inappropriate responses: No I avoid social activities because I cannot hear well and fear I will reply improperly: No   Family members and friends have told me they think I may have hearing loss: No             Visual Acuity:   Right Eye Visual Acuity: Uncorrected Right Eye Chart Acuity: 20/20   Left Eye Visual Acuity: Uncorrected Left Eye Chart Acuity: 20/20   Both Eyes Visual Acuity: Uncorrected Both Eyes Chart Acuity: 20/20   Able To Tolerate Visual Acuity: Yes        Assessment & Plan:   Ramos Garner is a 64 year old male who presents for a Medicare Assessment.     1. Medicare annual wellness visit, subsequent (Primary) exam is okay will order labs  -     CBC With Differential With Platelet; Future; Expected date: 02/07/2025  -     Comp Metabolic Panel (14); Future; Expected date: 02/07/2025  -     Lipid Panel; Future; Expected date: 02/07/2025  -     TSH W Reflex To Free T4; Future; Expected date: 02/07/2025  -     Urinalysis, Routine; Future; Expected date: 02/07/2025  -     PSA Total, Screen; Future; Expected date: 02/07/2025  -     EKG 12 Lead; Future; Expected date: 02/07/2025  2. Screening PSA (prostate specific antigen) will check PSA will follow results  -     CBC With Differential With Platelet; Future; Expected date: 02/07/2025  -     Comp Metabolic Panel (14); Future; Expected date: 02/07/2025  -     Lipid Panel; Future; Expected date: 02/07/2025  -     TSH  W Reflex To Free T4; Future; Expected date: 02/07/2025  -     Urinalysis, Routine; Future; Expected date: 02/07/2025  -     PSA Total, Screen; Future; Expected date: 02/07/2025  -     EKG 12 Lead; Future; Expected date: 02/07/2025  3. Screening for cholesterol level will follow labs  -     CBC With Differential With Platelet; Future; Expected date: 02/07/2025  -     Comp Metabolic Panel (14); Future; Expected date: 02/07/2025  -     Lipid Panel; Future; Expected date: 02/07/2025  -     TSH W Reflex To Free T4; Future; Expected date: 02/07/2025  -     Urinalysis, Routine; Future; Expected date: 02/07/2025  -     PSA Total, Screen; Future; Expected date: 02/07/2025  -     EKG 12 Lead; Future; Expected date: 02/07/2025  4. Screening for thyroid disorder will follow labs  -     CBC With Differential With Platelet; Future; Expected date: 02/07/2025  -     Comp Metabolic Panel (14); Future; Expected date: 02/07/2025  -     Lipid Panel; Future; Expected date: 02/07/2025  -     TSH W Reflex To Free T4; Future; Expected date: 02/07/2025  -     Urinalysis, Routine; Future; Expected date: 02/07/2025  -     PSA Total, Screen; Future; Expected date: 02/07/2025  -     EKG 12 Lead; Future; Expected date: 02/07/2025  5. Encounter for preventive care will refer to GI for screening colonoscopy  -     [facility] GI Telephone Colon Screen; Future; Expected date: 02/14/2025  -     EKG 12 Lead; Future; Expected date: 02/07/2025  Other orders  -     Prevnar 20 (PCV20) [18276]    The patient indicates understanding of these issues and agrees to the plan.  Reinforced healthy diet, lifestyle, and exercise.        No follow-ups on file.     Delfin Mcclure MD, 2/7/2025     Supplementary Documentation:   General Health:  In the past six months, have you lost more than 10 pounds without trying?: (Patient-Rptd) 2 - No  Has your appetite been poor?: (Patient-Rptd) No  Type of Diet: (Patient-Rptd) Balanced  How does the patient maintain a good energy  level?: (Patient-Rptd) Appropriate Exercise;Other  How would you describe your daily physical activity?: (Patient-Rptd) Moderate  How would you describe your current health state?: (Patient-Rptd) Good  How do you maintain positive mental well-being?: (Patient-Rptd) Social Interaction;Puzzles;Visiting Friends;Visiting Family  On a scale of 0 to 10, with 0 being no pain and 10 being severe pain, what is your pain level?: (Patient-Rptd) 0 - (None)  In the past six months, have you experienced urine leakage?: (Patient-Rptd) 0-No  At any time do you feel concerned for the safety/well-being of yourself and/or your children, in your home or elsewhere?: (Patient-Rptd) No  Have you had any immunizations at another office such as Influenza, Hepatitis B, Tetanus, or Pneumococcal?: (Patient-Rptd) Yes    Health Maintenance   Topic Date Due    Annual Physical  Never done    Colorectal Cancer Screening  Never done    PSA  Never done    Pneumococcal Vaccine: 50+ Years (1 of 1 - PCV) Never done    Influenza Vaccine  Completed    Annual Depression Screening  Completed    Fall Risk Screening (Annual)  Completed    Zoster Vaccines  Completed    COVID-19 Vaccine  Completed    Meningococcal B Vaccine  Aged Out

## 2025-02-09 DIAGNOSIS — R17 ELEVATED BILIRUBIN: ICD-10-CM

## 2025-02-09 DIAGNOSIS — R77.0 ABNORMAL ALBUMIN: Primary | ICD-10-CM

## 2025-02-09 LAB — BILIRUB DIRECT SERPL-MCNC: 0.4 MG/DL (ref ?–0.3)

## 2025-02-10 DIAGNOSIS — R17 ELEVATED BILIRUBIN: Primary | ICD-10-CM

## 2025-02-25 ENCOUNTER — NURSE ONLY (OUTPATIENT)
Facility: CLINIC | Age: 65
End: 2025-02-25

## 2025-02-25 DIAGNOSIS — Z86.0100 HISTORY OF COLON POLYPS: ICD-10-CM

## 2025-02-25 DIAGNOSIS — Z12.11 COLON CANCER SCREENING: Primary | ICD-10-CM

## 2025-02-25 DIAGNOSIS — Z80.0 FAMILY HISTORY OF COLON CANCER: ICD-10-CM

## 2025-02-25 NOTE — PROGRESS NOTES
GI Staff:  [facility] Colon Screening Orders    Please schedule: Colonoscopy 12923 with MAC OR IV (if appropriate)    Please send split dose Golytely bowel prep     Diagnosis: Colon Screening Z12.11 / History of Colon polyps  Z86.010 / Family history of colon cancer Z80.0      Medication adjustments:None      >>>Please inform patient if new medications are started after scheduling procedure they need to call clinic to notify us.

## 2025-02-25 NOTE — PROGRESS NOTES
Patient requested to see Dr. Carrion.    Scheduled for:  Colonoscopy 34213  Provider Name:  Dr. Carrion  Date:  7/11/2025  Location:  [facility]      Sedation:  MAC  Time:  9:00 AM - Patient is aware [facility] will call the day before with arrival time.  Prep:  Golytely  Meds/Allergies Reconciled?:  MA reviewed   Diagnosis with codes:  Colon cancer screening Z12.11; Hx: Colon polyps Z86.010; Fam Hx: Colon cancer Z80.0  Was patient informed to call insurance with codes (Y/N):  Yes, I confirmed MEDICARE insurance with the patient.   Referral sent?:  Referral was sent at the time of electronic surgical scheduling.   EM or [facility] notified?:  I sent an electronic request to Endo Scheduling and received a confirmation today.   Medication Orders:  Hold multivitamins/supplements one week prior to procedure.  Misc Orders:  N/A     Further instructions given by staff: I discussed the prep instructions with the patient which he verbally understood and is aware that I will send the instructions today.

## 2025-02-25 NOTE — PROGRESS NOTES
Called patient for scheduled telephone colon screening/positive FIT result.   Medications, pharmacy, and allergies verified with the patient.   Please advise on colonoscopy and bowel prep orders.     Age 45-64 y/o (Y/N):   66-74 y/o ? Route to GI provider per rotation schedule   › GI MD preference: Dr Carrion  › Insurance:  MEDICARE  › Last PCP Visit: 2/7/2025  IF NO PCP within [facility] ? GI in-person consultation   › Last CBC drawn: 2/7/2025  › Date of positive FIT test: N/A  › H/W/BMI: 5'11\"/167 lb/23.3    Special comments/notes:  Telephone Colon Screening Questionnaire Yes No   Are you currently experiencing any new/abnormal GI symptoms? [] _   If yes, explain:     Rectal bleeding? [] [x]   Black stool? [] [x]   Dysphagia or food \"feeling stuck\" when eating? [] [x]   Intractable vomiting? [] [x]   Unexplained weight loss? [] [x]   First colonoscopy? 5-7 years ago at [place] Hx of Polyps [] [x]   Family history of colon cancer?Maternal Grandmother [x] []   Any issues with anesthesia? [] [x]   If yes, explain:      Have you had a stroke, heart attack or stent placement in the last 12 months?  [] [x]   If yes ? GI in-person consultation      Personal history of resp. Issues/oxygen use/SHAWNA/COPD [] [x]   If yes, CPAP/BiPAP?     History of devices (pacemaker/defibrillator) [] [x]   History of cardiac/CVA issues/(MI/stroke) (see above) [] [x]     Medications Yes  No   Anticoagulants  Anticoagulant (Except Aspirin) ? route to RN pool to request adjustments from prescribing provider  [] [x]   Diabetic Meds  PO ? hold DAY PRIOR and DAY OF procedure  Insulin ? route to RN pool to request adjustments from prescribing provider  [] [x]   Weight loss meds (Phentermine/Vyvanse/Saxsenda/etc): [] [x]   Iron/herbal/multivitamin supplement (RX/OTC): [] [x]   Usage of marijuana, CBD &/or vape products: [] [x]

## 2025-02-27 ENCOUNTER — LAB ENCOUNTER (OUTPATIENT)
Dept: LAB | Facility: HOSPITAL | Age: 65
End: 2025-02-27
Attending: INTERNAL MEDICINE
Payer: MEDICARE

## 2025-02-27 ENCOUNTER — HOSPITAL ENCOUNTER (OUTPATIENT)
Dept: ULTRASOUND IMAGING | Facility: HOSPITAL | Age: 65
Discharge: HOME | End: 2025-02-27
Attending: INTERNAL MEDICINE
Payer: MEDICARE

## 2025-02-27 DIAGNOSIS — R17 ELEVATED BILIRUBIN: ICD-10-CM

## 2025-02-27 DIAGNOSIS — Z00.00 MEDICARE ANNUAL WELLNESS VISIT, SUBSEQUENT: ICD-10-CM

## 2025-02-27 DIAGNOSIS — Z12.5 SCREENING PSA (PROSTATE SPECIFIC ANTIGEN): ICD-10-CM

## 2025-02-27 DIAGNOSIS — Z00.00 ENCOUNTER FOR PREVENTIVE CARE: ICD-10-CM

## 2025-02-27 DIAGNOSIS — Z13.220 SCREENING FOR CHOLESTEROL LEVEL: ICD-10-CM

## 2025-02-27 DIAGNOSIS — Z13.29 SCREENING FOR THYROID DISORDER: ICD-10-CM

## 2025-02-27 LAB
ATRIAL RATE: 46 BPM
P AXIS: 50 DEGREES
P-R INTERVAL: 174 MS
Q-T INTERVAL: 478 MS
QRS DURATION: 78 MS
QTC CALCULATION (BEZET): 418 MS
R AXIS: 45 DEGREES
T AXIS: 57 DEGREES
VENTRICULAR RATE: 46 BPM

## 2025-02-27 PROCEDURE — 93010 ELECTROCARDIOGRAM REPORT: CPT | Performed by: STUDENT IN AN ORGANIZED HEALTH CARE EDUCATION/TRAINING PROGRAM

## 2025-02-27 PROCEDURE — 76705 ECHO EXAM OF ABDOMEN: CPT | Performed by: INTERNAL MEDICINE

## 2025-02-27 PROCEDURE — 93005 ELECTROCARDIOGRAM TRACING: CPT

## 2025-03-05 DIAGNOSIS — R16.0 HEPATOMEGALY: Primary | ICD-10-CM

## 2025-03-10 ENCOUNTER — PATIENT MESSAGE (OUTPATIENT)
Dept: INTERNAL MEDICINE CLINIC | Facility: CLINIC | Age: 65
End: 2025-03-10

## 2025-05-11 NOTE — PROGRESS NOTES
[facility] - Gastroenterology                                                                                                               Reason for consult: eval    Requesting physician or provider: Delfin Mcclure MD    Chief Complaint   Patient presents with    Consult      Hepatomegaly       HPI:   Ramos Garner is a 64 year old year-old male without sig pmhx:     he is here today for evaluation  Ultrasound abd 2/2025    CONCLUSION:   1. Mild hepatomegaly.  Hepatic steatosis versus hepatocellular disease.  No focal hepatic lesion.   2. Normal gallbladder ultrasound.  Normal common duct.   3. Visualized portions pancreas unremarkable distal pancreatic tail obscured by bowel.    4. No free fluid hepatorenal fossa. No hydronephrosis right kidney.     Lfts 2/2025  Bili 1.7      No jaundice, ruq pain/ abd pain, pruritus, change in loc/confusion    he moves his bowels daily. No constipation, diarrhea.   he denies brbpr and/or melena.    he denies acid reflux and/or heartburn. he denies dysphagia, odynophagia and/or globus. he denies nausea and/or vomiting.  he denies recent change in appetite and/or unintentional weight loss.    NSAIDS: no  Tobacco: no  Alcohol: 5 nights/week has 1-2 beers, mixed drink, or wine - total 1-2 alcoholic beverages 5x/week  Marijuana: no  Illicit drugs:no  Acetaminophen: no  Herbals, green tea: no    No FH GI malignancy, IBD, celiac  No fhx liver, biliary disease    No history of adverse reaction to sedation  No SHAWNA  No anticoagulants  No pacemaker/defibrillator  No pain medications and/or sleep aides      Last colonoscopy: due for screening. planning for c-scope this summer  Last EGD: no    Wt Readings from Last 6 Encounters:   05/15/25 163 lb (73.9 kg)   02/07/25 167 lb (75.8 kg)   03/18/24 165 lb (74.8 kg)        History, Medications, Allergies, ROS:      Past Medical History[1]   Past Surgical History[2]   Family Hx: Family History[3]  Pt agreeable with NP Social History: Short Social Hx on File[4]     Medications (Active prior to today's visit):  Current Medications[5]    Allergies:  Allergies[6]    ROS:   CONSTITUTIONAL: negative for fevers, chills, sweats and weight loss  EYES Negative for red eyes, yellow eyes, changes in vision  HEENT: Negative for dysphagia and hoarseness  RESPIRATORY: Negative for cough and shortness of breath  CARDIOVASCULAR: Negative for chest pain, palpitations  GASTROINTESTINAL: See HPI  GENITOURINARY: Negative for dysuria and frequency  MUSCULOSKELETAL: Negative for arthralgias and myalgias  NEUROLOGICAL: Negative for dizziness and headaches  BEHAVIOR/PSYCH: Negative for anxiety and poor appetite    PHYSICAL EXAM:   Blood pressure 133/80, pulse 59, height 5' 11\" (1.803 m), weight 163 lb (73.9 kg).    GEN: WD/WN, NAD  HEENT: Supple symmetrical, trachea midline  CV: RRR, the extremities are warm and well perfused   LUNGS: No increased work of breathing  ABDOMEN: No scars, normal bowel sounds, soft, non-tender, non-distended no rebound or guarding, no masses, no hepatomegaly  MSK: No redness, no warmth, no swelling of joints  SKIN: No jaundice, no erythema, no rashes  HEMATOLOGIC: No bleeding, no bruising  NEURO: Alert and interactive, normal gait    Labs/Imaging/Procedures:     Patient's pertinent labs and imaging were reviewed and discussed with patient today.        .  ASSESSMENT/PLAN:   Ramos Garner is a 64 year old year-old male without sig pmhx:    #hyperbilirubinemia  #hepatomegaly  #hepatic steatosis  Noted to have high bilirubin in recent lab testing. 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 Ast/alt/alk phos normal. Ultrasound notable for hepatomegaly and hepatic steatosis.  Think etoh use may be conributing. No fhx liver/biliary disease. He is asymptomatic. Plan as below.    #crc screening  Due for screening in summer.     -labs  -low-fat diet  -healthy bmi  -monitor  cholesterol, blood sugar, liver function with pcp  -avoid/limit alcohol  -plan for ultrasound elastography in 1 year or sooner if clinically indicated    Orders This Visit:  Orders Placed This Encounter   Procedures    Hepatic Function Panel (7)    Hepatitis B Surface Antigen    HCV Antibody    Actin (Smooth Muscle) Antibody    Immunoglobulin A/G/M, Quant    Mitochondrial (M2) Antibody       Meds This Visit:  Requested Prescriptions      No prescriptions requested or ordered in this encounter       Imaging & Referrals:  None      Ryann EDoroteo Del Cid, APRN   5/11/2025        This note was partially prepared using Dragon Medical voice recognition dictation software. As a result, errors may occur. When identified, these errors have been corrected. While every attempt is made to correct errors during dictation, discrepancies may still exist.          [1] History reviewed. No pertinent past medical history.  [2]   Past Surgical History:  Procedure Laterality Date    Colonoscopy  unsure - 5+ yrs ago    Other surgical history  clavicle    Vasectomy     [3]   Family History  Problem Relation Age of Onset    Colon Cancer Maternal Grandmother    [4]   Social History  Socioeconomic History    Marital status: Single   Tobacco Use    Smoking status: Never     Passive exposure: Never    Smokeless tobacco: Never   Vaping Use    Vaping status: Never Used   Substance and Sexual Activity    Alcohol use: Yes     Alcohol/week: 8.0 standard drinks of alcohol     Types: 4 Glasses of wine, 2 Cans of beer, 2 Standard drinks or equivalent per week     Comment: occ    Drug use: Never     Social Drivers of Health      Received from [facility]   [5]   No current outpatient medications on file.   [6] No Known Allergies

## 2025-05-15 ENCOUNTER — OFFICE VISIT (OUTPATIENT)
Dept: GASTROENTEROLOGY | Facility: CLINIC | Age: 65
End: 2025-05-15

## 2025-05-15 VITALS
HEIGHT: 71 IN | HEART RATE: 59 BPM | BODY MASS INDEX: 22.82 KG/M2 | SYSTOLIC BLOOD PRESSURE: 133 MMHG | WEIGHT: 163 LBS | DIASTOLIC BLOOD PRESSURE: 80 MMHG

## 2025-05-15 DIAGNOSIS — E80.6 HYPERBILIRUBINEMIA: Primary | ICD-10-CM

## 2025-05-15 DIAGNOSIS — R16.0 HEPATOMEGALY: ICD-10-CM

## 2025-05-15 DIAGNOSIS — K76.0 HEPATIC STEATOSIS: ICD-10-CM

## 2025-05-15 DIAGNOSIS — Z12.11 COLON CANCER SCREENING: ICD-10-CM

## 2025-05-15 PROCEDURE — 99204 OFFICE O/P NEW MOD 45 MIN: CPT | Performed by: NURSE PRACTITIONER

## 2025-05-15 NOTE — PATIENT INSTRUCTIONS
-labs  -low-fat diet  -healthy bmi  -monitor cholesterol, blood sugar, liver function with pcp  -avoid/limit alcohol  -plan for ultrasound elastography in 1 year or sooner if clinically indicated

## 2025-05-16 ENCOUNTER — PATIENT MESSAGE (OUTPATIENT)
Dept: INTERNAL MEDICINE CLINIC | Facility: CLINIC | Age: 65
End: 2025-05-16

## 2025-05-16 DIAGNOSIS — Z12.83 SKIN CANCER SCREENING: Primary | ICD-10-CM

## 2025-05-17 NOTE — TELEPHONE ENCOUNTER
Trivie message sent to pt.  Derm referral pended.       Future Appointments   Date Time Provider Department Center   7/11/2025  9:00 AM [facility], PROCEDURE [id number] None

## 2025-06-09 ENCOUNTER — PATIENT MESSAGE (OUTPATIENT)
Dept: INTERNAL MEDICINE CLINIC | Facility: CLINIC | Age: 65
End: 2025-06-09

## 2025-07-03 ENCOUNTER — TELEPHONE (OUTPATIENT)
Facility: CLINIC | Age: 65
End: 2025-07-03

## 2025-07-03 NOTE — TELEPHONE ENCOUNTER
Left VM for PT. Told PT to call office if he had any changes in medications or questions for the procedure.    Verbal release reviewed

## 2025-07-11 ENCOUNTER — TELEPHONE (OUTPATIENT)
Age: 65
End: 2025-07-11

## 2025-07-11 PROBLEM — Z12.11 SCREENING FOR COLORECTAL CANCER: Status: ACTIVE | Noted: 2025-07-11

## 2025-07-11 PROBLEM — Z12.12 SCREENING FOR COLORECTAL CANCER: Status: ACTIVE | Noted: 2025-07-11

## 2025-07-11 NOTE — TELEPHONE ENCOUNTER
Health maintenance updated.     Last colonoscopy done 7/11/2025 by Dr Carrion.    Recall placed into Pt Outreach, next due on 7/ 2035 per Dr Carrion.

## 2025-08-27 ENCOUNTER — LAB ENCOUNTER (OUTPATIENT)
Dept: LAB | Facility: HOSPITAL | Age: 65
End: 2025-08-27
Attending: NURSE PRACTITIONER

## 2025-08-27 DIAGNOSIS — R16.0 HEPATOMEGALY: ICD-10-CM

## 2025-08-27 DIAGNOSIS — K76.0 HEPATIC STEATOSIS: ICD-10-CM

## 2025-08-27 DIAGNOSIS — E80.6 HYPERBILIRUBINEMIA: ICD-10-CM

## 2025-08-27 LAB
ALBUMIN SERPL-MCNC: 4.7 G/DL (ref 3.2–4.8)
ALP LIVER SERPL-CCNC: 106 U/L (ref 45–117)
ALT SERPL-CCNC: 14 U/L (ref 10–49)
AST SERPL-CCNC: 20 U/L (ref ?–34)
BILIRUB DIRECT SERPL-MCNC: 0.3 MG/DL (ref ?–0.3)
BILIRUB SERPL-MCNC: 1.3 MG/DL (ref 0.2–1.1)
HBV SURFACE AG SER-ACNC: <0.1
HBV SURFACE AG SERPL QL IA: NONREACTIVE
HCV AB SERPL QL IA: NONREACTIVE
IGA SERPL-MCNC: 333.7 MG/DL (ref 40–350)
IGM SERPL-MCNC: 78.5 MG/DL (ref 50–300)
IMMUNOGLOBULIN PNL SER-MCNC: 934 MG/DL (ref 650–1600)
PROT SERPL-MCNC: 7.2 G/DL (ref 5.7–8.2)

## 2025-08-27 PROCEDURE — 87340 HEPATITIS B SURFACE AG IA: CPT

## 2025-08-27 PROCEDURE — 36415 COLL VENOUS BLD VENIPUNCTURE: CPT

## 2025-08-27 PROCEDURE — 80076 HEPATIC FUNCTION PANEL: CPT

## 2025-08-27 PROCEDURE — 86803 HEPATITIS C AB TEST: CPT

## 2025-08-27 PROCEDURE — 82784 ASSAY IGA/IGD/IGG/IGM EACH: CPT

## 2025-08-27 PROCEDURE — 83516 IMMUNOASSAY NONANTIBODY: CPT

## 2025-08-28 LAB
ACTIN SMOOTH MUSCLE AB: 3 UNITS
M2 MITOCHONDRIAL AB: <20 UNITS